# Patient Record
Sex: MALE | Race: WHITE | NOT HISPANIC OR LATINO | URBAN - METROPOLITAN AREA
[De-identification: names, ages, dates, MRNs, and addresses within clinical notes are randomized per-mention and may not be internally consistent; named-entity substitution may affect disease eponyms.]

---

## 2022-09-13 ENCOUNTER — APPOINTMENT (RX ONLY)
Dept: URBAN - METROPOLITAN AREA CLINIC 144 | Facility: CLINIC | Age: 72
Setting detail: DERMATOLOGY
End: 2022-09-13

## 2022-09-13 DIAGNOSIS — G25.81 RESTLESS LEGS SYNDROME: ICD-10-CM | Status: INADEQUATELY CONTROLLED

## 2022-09-13 DIAGNOSIS — I83.81 VARICOSE VEINS OF LOWER EXTREMITIES WITH PAIN: ICD-10-CM | Status: INADEQUATELY CONTROLLED

## 2022-09-13 DIAGNOSIS — I87.2 VENOUS INSUFFICIENCY (CHRONIC) (PERIPHERAL): ICD-10-CM

## 2022-09-13 DIAGNOSIS — I87.2 VENOUS INSUFFICIENCY (CHRONIC) (PERIPHERAL): ICD-10-CM | Status: INADEQUATELY CONTROLLED

## 2022-09-13 DIAGNOSIS — R60.0 LOCALIZED EDEMA: ICD-10-CM | Status: INADEQUATELY CONTROLLED

## 2022-09-13 DIAGNOSIS — I78.8 OTHER DISEASES OF CAPILLARIES: ICD-10-CM

## 2022-09-13 PROBLEM — I83.819 VARICOSE VEINS OF UNSPECIFIED LOWER EXTREMITY WITH PAIN: Status: ACTIVE | Noted: 2022-09-13

## 2022-09-13 PROCEDURE — 36465 NJX NONCMPND SCLRSNT 1 VEIN: CPT | Mod: RT

## 2022-09-13 PROCEDURE — ? PHLEBECTOMY

## 2022-09-13 PROCEDURE — 37766 PHLEB VEINS - EXTREM 20+: CPT

## 2022-09-13 PROCEDURE — ? VARITHENA SCLEROTHERAPY

## 2022-09-13 PROCEDURE — ? COUNSELING

## 2022-09-13 PROCEDURE — 99213 OFFICE O/P EST LOW 20 MIN: CPT | Mod: 25

## 2022-09-13 ASSESSMENT — LOCATION DETAILED DESCRIPTION DERM
LOCATION DETAILED: RIGHT PROXIMAL PRETIBIAL REGION
LOCATION DETAILED: RIGHT MEDIAL PROXIMAL PRETIBIAL REGION
LOCATION DETAILED: RIGHT PROXIMAL PRETIBIAL REGION
LOCATION DETAILED: RIGHT MEDIAL PROXIMAL PRETIBIAL REGION
LOCATION DETAILED: RIGHT MEDIAL PROXIMAL PRETIBIAL REGION
LOCATION DETAILED: RIGHT PROXIMAL PRETIBIAL REGION

## 2022-09-13 ASSESSMENT — LOCATION SIMPLE DESCRIPTION DERM
LOCATION SIMPLE: RIGHT PRETIBIAL REGION

## 2022-09-13 ASSESSMENT — LOCATION ZONE DERM
LOCATION ZONE: LEG

## 2022-09-13 NOTE — PROCEDURE: VARITHENA SCLEROTHERAPY
Show Inventory Tab: Hide
Disposition: Compression stockings and short stretch elastic bandages were placed postoperatively.
Render Post Care In Note: No
Lot # [id number]: [id number]
Detail Level: Simple
Consent was obtained with risks, benefits, and alternatives discussed for the above procedures. Photographs were taken.
Expiration Date A (Month Year): 07/2023
Number Of Injections (Will Not Render If 0): 0
Post-Care Instructions: Compression for 3 weeks is critical to optimize your recovery and results. Compliance with compression helps to prevent blood clots and minimizes pain, swelling, bruising, skin discoloration (staining) and the recurrence of vessels. Materials to gather for your wound care (all available over the counter): Compression stockings x 2 pairs, 4 x 4 gauze, Comprilan wrap: 8 cm and 10 cm width wrap, Medical adhesive tape. Compression and Wound care;  Leg compression for 3 weeks is alejandra to your success and safety. Compression at night is only needed the first day. After that, compression is needed only during waking hours. However, if your leg feels better with compression at night, then you may continue compression at night as tolerated. After the sclerotherapy procedure, 2 layers compression will be placed. 1. On the skin, folded or flat gauze will cover the treated areas. 2. A compression wrap (Comprilan) will be wrapped around your leg over the gauze. Once the compression wrap is in place, a compression stocking will be worn. This two layer  compression (wrap plus stocking) should be worn for the first 24 hours if tolerated. 3. After 24 hours, remove all compressions and dressings and just wear the compression stockings only during waking hours. You will need to wear compression stockings for three weeks after your procedure, unless your physician instructs you otherwise. Activity: It is important NOT to be sitting or lying down for several hours after surgery. You may begin walking immediately after surgery. This is good for you, but take it easy. For 2 days after treatment: Avoid aerobic exercise, weight training, and all other types of exertion that increase your breathing and pulse rates. Do not get a tan for one month after sclerotherapy. Tanning increases your risk of skin discoloration. Bathing:       After 24 hours, you may shower or bathe in tepid water, but keep the compression stocking on. Avoid immersion in hot tubs. For pain or discomfort: You may take acetaminophen (TylenolTM, Extra-Strength TylenolTM or DatrilTM) as directed. Do not use aspirin, products containing aspirin, or ibuprofen (for example AdvilTM or MotrinTM) for five days after your surgery, unless approved by your physician. Dietary restrictions: Do not drink alcoholic beverages for two days after your sclerotherapy procedure. Possible side effects following sclerotherapy:  After sclerotherapy, mild swelling is expected. The injection sites may also become bruised or gray. You may also experience one or more of the following side effects, which almost always go away within one to four months:       Darkening of the injected veins. Brownish staining of the skin. Small clotted vessels under the surface of the skin that you can feel. Bruising of the injection sites:       What to do about bruising - This will resolve within 2-3 weeks. If you wish the bruising to disappear sooner, then applying Arnicare cream (over the counter, health food stores) will help. What to do if you feel small clotted vessels under the surface of the skin:       Call us for a follow up appointment. These small clots can be drained through a small nick. Draining these small clots will help you heal faster and with less discoloration. Contact your physician if you experience any of the following:       Treated areas become increasingly sore, tender, red or warm. Acetaminophen does not relieve your discomfort. Injection sites turn black or the skin around the site breaks down. Ulceration of the injection sites. You develop blisters from the tape. You develop significant swelling or pain in the leg. Darkening of large areas of the skin or foot.
Sclerosant Volume (Cc): 3
Sclerosant (A): Varithena Foam

## 2022-09-13 NOTE — PROCEDURE: VARITHENA SCLEROTHERAPY
Show Inventory Tab: Hide
Disposition: Compression stockings and short stretch elastic bandages were placed postoperatively.
Render Post Care In Note: No
Lot # [id number]: [id number]
Detail Level: Simple
Consent was obtained with risks, benefits, and alternatives discussed for the above procedures. Photographs were taken.
Expiration Date A (Month Year): 07/2023
Number Of Injections (Will Not Render If 0): 0
Post-Care Instructions: Compression for 3 weeks is critical to optimize your recovery and results. Compliance with compression helps to prevent blood clots and minimizes pain, swelling, bruising, skin discoloration (staining) and the recurrence of vessels. Materials to gather for your wound care (all available over the counter): Compression stockings x 2 pairs, 4 x 4 gauze, Comprilan wrap: 8 cm and 10 cm width wrap, Medical adhesive tape. Compression and Wound care;  Leg compression for 3 weeks is alejandra to your success and safety. Compression at night is only needed the first day. After that, compression is needed only during waking hours. However, if your leg feels better with compression at night, then you may continue compression at night as tolerated. After the sclerotherapy procedure, 2 layers compression will be placed. 1. On the skin, folded or flat gauze will cover the treated areas. 2. A compression wrap (Comprilan) will be wrapped around your leg over the gauze. Once the compression wrap is in place, a compression stocking will be worn. This two layer  compression (wrap plus stocking) should be worn for the first 24 hours if tolerated. 3. After 24 hours, remove all compressions and dressings and just wear the compression stockings only during waking hours. You will need to wear compression stockings for three weeks after your procedure, unless your physician instructs you otherwise. Activity: It is important NOT to be sitting or lying down for several hours after surgery. You may begin walking immediately after surgery. This is good for you, but take it easy. For 2 days after treatment: Avoid aerobic exercise, weight training, and all other types of exertion that increase your breathing and pulse rates. Do not get a tan for one month after sclerotherapy. Tanning increases your risk of skin discoloration. Bathing:       After 24 hours, you may shower or bathe in tepid water, but keep the compression stocking on. Avoid immersion in hot tubs. For pain or discomfort: You may take acetaminophen (TylenolTM, Extra-Strength TylenolTM or DatrilTM) as directed. Do not use aspirin, products containing aspirin, or ibuprofen (for example AdvilTM or MotrinTM) for five days after your surgery, unless approved by your physician. Dietary restrictions: Do not drink alcoholic beverages for two days after your sclerotherapy procedure. Possible side effects following sclerotherapy:  After sclerotherapy, mild swelling is expected. The injection sites may also become bruised or gray. You may also experience one or more of the following side effects, which almost always go away within one to four months:       Darkening of the injected veins. Brownish staining of the skin. Small clotted vessels under the surface of the skin that you can feel. Bruising of the injection sites:       What to do about bruising - This will resolve within 2-3 weeks. If you wish the bruising to disappear sooner, then applying Arnicare cream (over the counter, health food stores) will help. What to do if you feel small clotted vessels under the surface of the skin:       Call us for a follow up appointment. These small clots can be drained through a small nick. Draining these small clots will help you heal faster and with less discoloration. Contact your physician if you experience any of the following:       Treated areas become increasingly sore, tender, red or warm. Acetaminophen does not relieve your discomfort. Injection sites turn black or the skin around the site breaks down. Ulceration of the injection sites. You develop blisters from the tape. You develop significant swelling or pain in the leg. Darkening of large areas of the skin or foot.
Sclerosant Volume (Cc): 3
Sclerosant (A): Varithena Foam

## 2022-09-13 NOTE — PROCEDURE: VARITHENA SCLEROTHERAPY
Show Inventory Tab: Hide
Disposition: Compression stockings and short stretch elastic bandages were placed postoperatively.
Render Post Care In Note: No
Lot # A: [id number]
Detail Level: Simple
Consent was obtained with risks, benefits, and alternatives discussed for the above procedures. Photographs were taken.
Expiration Date A (Month Year): 07/2023
Number Of Injections (Will Not Render If 0): 0
Post-Care Instructions: Compression for 3 weeks is critical to optimize your recovery and results. Compliance with compression helps to prevent blood clots and minimizes pain, swelling, bruising, skin discoloration (staining) and the recurrence of vessels. Materials to gather for your wound care (all available over the counter): Compression stockings x 2 pairs, 4 x 4 gauze, Comprilan wrap: 8 cm and 10 cm width wrap, Medical adhesive tape. Compression and Wound care;  Leg compression for 3 weeks is alejandra to your success and safety. Compression at night is only needed the first day. After that, compression is needed only during waking hours. However, if your leg feels better with compression at night, then you may continue compression at night as tolerated. After the sclerotherapy procedure, 2 layers compression will be placed. 1. On the skin, folded or flat gauze will cover the treated areas. 2. A compression wrap (Comprilan) will be wrapped around your leg over the gauze. Once the compression wrap is in place, a compression stocking will be worn. This two layer  compression (wrap plus stocking) should be worn for the first 24 hours if tolerated. 3. After 24 hours, remove all compressions and dressings and just wear the compression stockings only during waking hours. You will need to wear compression stockings for three weeks after your procedure, unless your physician instructs you otherwise. Activity: It is important NOT to be sitting or lying down for several hours after surgery. You may begin walking immediately after surgery. This is good for you, but take it easy. For 2 days after treatment: Avoid aerobic exercise, weight training, and all other types of exertion that increase your breathing and pulse rates. Do not get a tan for one month after sclerotherapy. Tanning increases your risk of skin discoloration. Bathing:       After 24 hours, you may shower or bathe in tepid water, but keep the compression stocking on. Avoid immersion in hot tubs. For pain or discomfort: You may take acetaminophen (TylenolTM, Extra-Strength TylenolTM or DatrilTM) as directed. Do not use aspirin, products containing aspirin, or ibuprofen (for example AdvilTM or MotrinTM) for five days after your surgery, unless approved by your physician. Dietary restrictions: Do not drink alcoholic beverages for two days after your sclerotherapy procedure. Possible side effects following sclerotherapy:  After sclerotherapy, mild swelling is expected. The injection sites may also become bruised or gray. You may also experience one or more of the following side effects, which almost always go away within one to four months:       Darkening of the injected veins. Brownish staining of the skin. Small clotted vessels under the surface of the skin that you can feel. Bruising of the injection sites:       What to do about bruising - This will resolve within 2-3 weeks. If you wish the bruising to disappear sooner, then applying Arnicare cream (over the counter, health food stores) will help. What to do if you feel small clotted vessels under the surface of the skin:       Call us for a follow up appointment. These small clots can be drained through a small nick. Draining these small clots will help you heal faster and with less discoloration. Contact your physician if you experience any of the following:       Treated areas become increasingly sore, tender, red or warm. Acetaminophen does not relieve your discomfort. Injection sites turn black or the skin around the site breaks down. Ulceration of the injection sites. You develop blisters from the tape. You develop significant swelling or pain in the leg. Darkening of large areas of the skin or foot.
Sclerosant Volume (Cc): 3
Sclerosant (A): Varithena Foam

## 2022-09-13 NOTE — PROCEDURE: MIPS QUALITY
Quality 47: Advance Care Plan: Advance Care Planning discussed and documented in the medical record; patient did not wish or was not able to name a surrogate decision maker or provide an advance care plan.
Detail Level: Detailed
Quality 111:Pneumonia Vaccination Status For Older Adults: Pneumococcal vaccine (PPSV23) was not administered on or after patientâs 60th birthday and before the end of the measurement period, reason not otherwise specified
Quality 130: Documentation Of Current Medications In The Medical Record: Current Medications Documented

## 2022-09-13 NOTE — PROCEDURE: PHLEBECTOMY
Detail Level: Simple
Consent was obtained with risks, benefits, and alternatives discussed for the above procedures. Photographs were taken. Preoperative medications were taken as above.
Disposition: Compression dressings were placed and stockings. Wound care instructions were given, verbal and written.
Tumescent Anesthesia Volume In Cc: 10
Post-Care Instructions: Compression is critical to optimize your recovery and results. Compression helps to prevent blood clots and minimizes pain, swelling, bruising and bleeding. \nMaterials to gather for your wound care (all available over the counter)-       \n1. Compression stockings x 2 pairs      \n2. Coban or Comprilan wraps (ACE Wraps are not a good substitute) x 2      \n3. Hibiclens solution      \n4. Telfa pads (nonstick gauze)      \n5. 4 x 4 gauze      \n6. Aquaphor or Vaseline ointment       \n7. Medical adhesive tape      \n___1. If you have had sedation, you must have a  with you for the first 24 hours after surgery and must not operate any motor vehicles or equipment that requires alertness and coordination. \n___2. Activity       \n - It is important NOT to be sitting or lying down for several hours after surgery. You may begin walking immediately after surgery. \n - As part of your surgery, an anesthetic fluid was used to numb your skin. Most of this fluid is safely absorbed by your body but some of the fluid may drain from the small skin incisions. \n - For 2 days after treatment: Avoid aerobic exercise, weight training, and all other types of exertion that increase your breathing and pulse rates. \n___3. Compression and Wound care; Leg compression is vital to your success and safety. It is needed for 2 weeks minimum. \nAfter your procedure, we will place gauze over your treated site(s) followed by a wrap (Comprilan or Coban), which is left in place for 24 hours. Your elastic stocking is then worn over the wrap. At bedtime, you may remove the stocking to sleep but keep the inner wrap (Comprilan or Coban) on.         \na. After 24 hours, remove the wrap and gauze. Make sure you are lying down. \nb. If you experience bleeding from the surgery site, place gauze over the area and apply firm pressure for 15 minutes without lifting. You may repeat this once. If bleeding persists, contact your physician. \nc. Change your dressing once daily. Lie down and remove the stockings and inner Comprilan wrap but not the gauze. Take a shower with the gauze on and get it wet. \nd. Remove the wet gauze in the shower and clean the incisions with diluted Hibiclens solution. Finish showering and pat dry. \ne. Place some ointment (Aquaphor, Vaseline) over any incision sites and apply Telfa or non-stick gauze pad. Secure the Telfa or non-stick gauze pad with medical tape. \nf. While lying down, rewrap your treated leg with Coban or Comprilan  followed by your stocking. The inner wrap and outer stocking combination should be worn for the first 7 days. \ng. After the first week, you may discontinue the inner wrap and only wear stockings for another 7 days. The longer you wear the stockings beyond the minimum 2 weeks, the faster you will heal.      \n___4. Bathing       \n - Do not bathe for 24 hours. After 24 hours, you may shower in warm (not hot) water. \n - Clean your surgery sites during showering (3e) and when you are finished bathing, pat your leg dry with a towel and repeat wound care instructions in 3f and 3g. \n - For one week after treatment:  Avoid immersion in hot tubs      \n___5. Discomfort; Any pain or discomfort should decrease with each day after surgery. \n - You may take acetaminophen (Tylenol, Extra-Strength Tylenol or Datril) or prescribed medicines as directed. If your pain is not better, then please contact your physician      \n - Do not use aspirin, products containing aspirin, or ibuprofen (for example [address] or MotrinTM) for five days after your surgery, unless approved by your physician. \n___6. Dietary restrictions; Do not drink alcoholic beverages for two days after your phlebectomy. \nWhat to expect after microphlebectomy       \n - Bruising and swelling at the site of the surgery are expected and is usually gone by 2 weeks. \n - When to contact your physician Contact your physician if you experience any of the following:       \n - Treated areas become increasingly sore, tender, red or warm.        \n - Incision sites have pus like drainage      \n - Acetaminophen does not relieve your discomfort       \n - Fever higher than 100.4 degrees Fahrenheit (38 degrees Celsius)
Hemostasis: Pressure
Estimated Blood Loss (Cc): minimal
Medical Necessity Information: LCD Guidelines vary from payer to payer. Please check with your payer's policy to determine medical necessity.
Medical Necessity Clause: This therapy was medically necessary because the patient has
Epidermal Sutures (Optional): 5-0 Ethilon
Number Of Incisions Per Microphlebectomy: [address] [facility]

## 2022-09-13 NOTE — HPI: PROCEDURE (INJECTION)
Have You Had This Injection Before?: has not been previously injected
Body Location Override (Optional): Right distal gsv

## 2022-09-13 NOTE — PROCEDURE: PHLEBECTOMY
Detail Level: Simple
Consent was obtained with risks, benefits, and alternatives discussed for the above procedures. Photographs were taken. Preoperative medications were taken as above.
Disposition: Compression dressings were placed and stockings. Wound care instructions were given, verbal and written.
Tumescent Anesthesia Volume In Cc: 10
Post-Care Instructions: Compression is critical to optimize your recovery and results. Compression helps to prevent blood clots and minimizes pain, swelling, bruising and bleeding. \nMaterials to gather for your wound care (all available over the counter)-       \n1. Compression stockings x 2 pairs      \n2. Coban or Comprilan wraps (ACE Wraps are not a good substitute) x 2      \n3. Hibiclens solution      \n4. Telfa pads (nonstick gauze)      \n5. 4 x 4 gauze      \n6. Aquaphor or Vaseline ointment       \n7. Medical adhesive tape      \n___1. If you have had sedation, you must have a  with you for the first 24 hours after surgery and must not operate any motor vehicles or equipment that requires alertness and coordination. \n___2. Activity       \n - It is important NOT to be sitting or lying down for several hours after surgery. You may begin walking immediately after surgery. \n - As part of your surgery, an anesthetic fluid was used to numb your skin. Most of this fluid is safely absorbed by your body but some of the fluid may drain from the small skin incisions. \n - For 2 days after treatment: Avoid aerobic exercise, weight training, and all other types of exertion that increase your breathing and pulse rates. \n___3. Compression and Wound care; Leg compression is vital to your success and safety. It is needed for 2 weeks minimum. \nAfter your procedure, we will place gauze over your treated site(s) followed by a wrap (Comprilan or Coban), which is left in place for 24 hours. Your elastic stocking is then worn over the wrap. At bedtime, you may remove the stocking to sleep but keep the inner wrap (Comprilan or Coban) on.         \na. After 24 hours, remove the wrap and gauze. Make sure you are lying down. \nb. If you experience bleeding from the surgery site, place gauze over the area and apply firm pressure for 15 minutes without lifting. You may repeat this once. If bleeding persists, contact your physician. \nc. Change your dressing once daily. Lie down and remove the stockings and inner Comprilan wrap but not the gauze. Take a shower with the gauze on and get it wet. \nd. Remove the wet gauze in the shower and clean the incisions with diluted Hibiclens solution. Finish showering and pat dry. \ne. Place some ointment (Aquaphor, Vaseline) over any incision sites and apply Telfa or non-stick gauze pad. Secure the Telfa or non-stick gauze pad with medical tape. \nf. While lying down, rewrap your treated leg with Coban or Comprilan  followed by your stocking. The inner wrap and outer stocking combination should be worn for the first 7 days. \ng. After the first week, you may discontinue the inner wrap and only wear stockings for another 7 days. The longer you wear the stockings beyond the minimum 2 weeks, the faster you will heal.      \n___4. Bathing       \n - Do not bathe for 24 hours. After 24 hours, you may shower in warm (not hot) water. \n - Clean your surgery sites during showering (3e) and when you are finished bathing, pat your leg dry with a towel and repeat wound care instructions in 3f and 3g. \n - For one week after treatment:  Avoid immersion in hot tubs      \n___5. Discomfort; Any pain or discomfort should decrease with each day after surgery. \n - You may take acetaminophen (Tylenol, Extra-Strength Tylenol or Datril) or prescribed medicines as directed. If your pain is not better, then please contact your physician      \n - Do not use aspirin, products containing aspirin, or ibuprofen (for example [address] or MotrinTM) for five days after your surgery, unless approved by your physician. \n___6. Dietary restrictions; Do not drink alcoholic beverages for two days after your phlebectomy. \nWhat to expect after microphlebectomy       \n - Bruising and swelling at the site of the surgery are expected and is usually gone by 2 weeks. \n - When to contact your physician Contact your physician if you experience any of the following:       \n - Treated areas become increasingly sore, tender, red or warm.        \n - Incision sites have pus like drainage      \n - Acetaminophen does not relieve your discomfort       \n - Fever higher than 100.4 degrees Fahrenheit (38 degrees Celsius)
Hemostasis: Pressure
Estimated Blood Loss (Cc): minimal
Medical Necessity Information: LCD Guidelines vary from payer to payer. Please check with your payer's policy to determine medical necessity.
Medical Necessity Clause: This therapy was medically necessary because the patient has
Epidermal Sutures (Optional): 5-0 Ethilon
Number Of Incisions Per Microphlebectomy: [address] [facility], [place]

## 2022-09-13 NOTE — PROCEDURE: PHLEBECTOMY
Detail Level: Simple
Consent was obtained with risks, benefits, and alternatives discussed for the above procedures. Photographs were taken. Preoperative medications were taken as above.
Disposition: Compression dressings were placed and stockings. Wound care instructions were given, verbal and written.
Tumescent Anesthesia Volume In Cc: 10
Post-Care Instructions: Compression is critical to optimize your recovery and results. Compression helps to prevent blood clots and minimizes pain, swelling, bruising and bleeding. \nMaterials to gather for your wound care (all available over the counter)-       \n1. Compression stockings x 2 pairs      \n2. Coban or Comprilan wraps (ACE Wraps are not a good substitute) x 2      \n3. Hibiclens solution      \n4. Telfa pads (nonstick gauze)      \n5. 4 x 4 gauze      \n6. Aquaphor or Vaseline ointment       \n7. Medical adhesive tape      \n___1. If you have had sedation, you must have a  with you for the first 24 hours after surgery and must not operate any motor vehicles or equipment that requires alertness and coordination. \n___2. Activity       \n - It is important NOT to be sitting or lying down for several hours after surgery. You may begin walking immediately after surgery. \n - As part of your surgery, an anesthetic fluid was used to numb your skin. Most of this fluid is safely absorbed by your body but some of the fluid may drain from the small skin incisions. \n - For 2 days after treatment: Avoid aerobic exercise, weight training, and all other types of exertion that increase your breathing and pulse rates. \n___3. Compression and Wound care; Leg compression is vital to your success and safety. It is needed for 2 weeks minimum. \nAfter your procedure, we will place gauze over your treated site(s) followed by a wrap (Comprilan or Coban), which is left in place for 24 hours. Your elastic stocking is then worn over the wrap. At bedtime, you may remove the stocking to sleep but keep the inner wrap (Comprilan or Coban) on.         \na. After 24 hours, remove the wrap and gauze. Make sure you are lying down. \nb. If you experience bleeding from the surgery site, place gauze over the area and apply firm pressure for 15 minutes without lifting. You may repeat this once. If bleeding persists, contact your physician. \nc. Change your dressing once daily. Lie down and remove the stockings and inner Comprilan wrap but not the gauze. Take a shower with the gauze on and get it wet. \nd. Remove the wet gauze in the shower and clean the incisions with diluted Hibiclens solution. Finish showering and pat dry. \ne. Place some ointment (Aquaphor, Vaseline) over any incision sites and apply Telfa or non-stick gauze pad. Secure the Telfa or non-stick gauze pad with medical tape. \nf. While lying down, rewrap your treated leg with Coban or Comprilan  followed by your stocking. The inner wrap and outer stocking combination should be worn for the first 7 days. \ng. After the first week, you may discontinue the inner wrap and only wear stockings for another 7 days. The longer you wear the stockings beyond the minimum 2 weeks, the faster you will heal.      \n___4. Bathing       \n - Do not bathe for 24 hours. After 24 hours, you may shower in warm (not hot) water. \n - Clean your surgery sites during showering (3e) and when you are finished bathing, pat your leg dry with a towel and repeat wound care instructions in 3f and 3g. \n - For one week after treatment:  Avoid immersion in hot tubs      \n___5. Discomfort; Any pain or discomfort should decrease with each day after surgery. \n - You may take acetaminophen (Tylenol, Extra-Strength Tylenol or Datril) or prescribed medicines as directed. If your pain is not better, then please contact your physician      \n - Do not use aspirin, products containing aspirin, or ibuprofen (for example [address] or MotrinTM) for five days after your surgery, unless approved by your physician. \n___6. Dietary restrictions; Do not drink alcoholic beverages for two days after your phlebectomy. \nWhat to expect after microphlebectomy       \n - Bruising and swelling at the site of the surgery are expected and is usually gone by 2 weeks. \n - When to contact your physician Contact your physician if you experience any of the following:       \n - Treated areas become increasingly sore, tender, red or warm.        \n - Incision sites have pus like drainage      \n - Acetaminophen does not relieve your discomfort       \n - Fever higher than 100.4 degrees Fahrenheit (38 degrees Celsius)
Hemostasis: Pressure
Estimated Blood Loss (Cc): minimal
Medical Necessity Information: LCD Guidelines vary from payer to payer. Please check with your payer's policy to determine medical necessity.
Medical Necessity Clause: This therapy was medically necessary because the patient has
Epidermal Sutures (Optional): 5-0 Ethilon
Number Of Incisions Per Microphlebectomy: [address] [facility]

## 2022-09-20 ENCOUNTER — APPOINTMENT (RX ONLY)
Dept: URBAN - METROPOLITAN AREA CLINIC 144 | Facility: CLINIC | Age: 72
Setting detail: DERMATOLOGY
End: 2022-09-20

## 2022-09-20 DIAGNOSIS — Z48.02 ENCOUNTER FOR REMOVAL OF SUTURES: ICD-10-CM

## 2022-09-20 PROCEDURE — ? SUTURE REMOVAL (GLOBAL PERIOD)

## 2022-09-20 ASSESSMENT — LOCATION DETAILED DESCRIPTION DERM: LOCATION DETAILED: RIGHT MEDIAL PROXIMAL PRETIBIAL REGION

## 2022-09-20 ASSESSMENT — LOCATION ZONE DERM: LOCATION ZONE: LEG

## 2022-09-20 ASSESSMENT — LOCATION SIMPLE DESCRIPTION DERM: LOCATION SIMPLE: RIGHT PRETIBIAL REGION

## 2022-09-20 NOTE — PROCEDURE: SUTURE REMOVAL (GLOBAL PERIOD)
Body Location Override (Optional - Billing Will Still Be Based On Selected Body Map Location If Applicable): right medial lower leg
Detail Level: Detailed
Add 97471 Cpt? (Important Note: In 2017 The Use Of 47578 Is Being Tracked By Cms To Determine Future Global Period Reimbursement For Global Periods): no

## 2022-12-13 ENCOUNTER — APPOINTMENT (RX ONLY)
Dept: URBAN - METROPOLITAN AREA CLINIC 144 | Facility: CLINIC | Age: 72
Setting detail: DERMATOLOGY
End: 2022-12-13

## 2022-12-13 DIAGNOSIS — I87.2 VENOUS INSUFFICIENCY (CHRONIC) (PERIPHERAL): ICD-10-CM

## 2022-12-13 PROCEDURE — ? MEDICAL CONSULTATION: VENOUS DISEASE

## 2022-12-13 PROCEDURE — ? LOWER EXTREMITY DOPPLER US

## 2022-12-13 PROCEDURE — 99213 OFFICE O/P EST LOW 20 MIN: CPT

## 2022-12-13 PROCEDURE — 93971 EXTREMITY STUDY: CPT

## 2022-12-13 ASSESSMENT — LOCATION ZONE DERM: LOCATION ZONE: LEG

## 2022-12-13 ASSESSMENT — LOCATION DETAILED DESCRIPTION DERM: LOCATION DETAILED: RIGHT DISTAL PRETIBIAL REGION

## 2022-12-13 ASSESSMENT — LOCATION SIMPLE DESCRIPTION DERM: LOCATION SIMPLE: RIGHT PRETIBIAL REGION

## 2022-12-13 NOTE — PROCEDURE: LOWER EXTREMITY DOPPLER US
Recommend Sclerotherapy With Ultrasound Guidance On Right Side: No
Continue Conservative Therapy: Yes
Right Tributary Reflux: 4.0-4.5 seconds
Left Intraluminal Thrombus- Yes: The left deep veins were imaged from the level of the common femoral vein to the posterior tibial veins. There was evidence of intraluminal thrombus as noted above.
Right Intraluminal Thrombus- Yes: The right deep veins were imaged from the level of the common femoral vein to the posterior tibial veins. There was evidence of intraluminal thrombus as noted above.
Right Tributary Size: 4-5 mm
Size Options: Use Range
Size: 6-7 mm
Right Assessment Comments: Recommended treatment: Varithena w/ Mp.
Detail Level: Zone
Left Intraluminal Thrombus- No: The left deep veins were imaged from the level of the common femoral vein to the posterior tibial veins. All deep veins demonstrated compressibility without evidence of intraluminal thrombus.
Right Intraluminal Thrombus- No: The right deep veins were imaged from the level of the common femoral vein to the posterior tibial veins. All deep veins demonstrated compressibility without evidence of intraluminal thrombus.
See Attached Documentation Text: Please refer to the attached ultrasound documentation for complete details of the procedure and the venous findings.
Continue Conservative Therapy Text: Continue conservative treatment (such as compression stockings, OTC analgesics, and exercise) and consider intervention if no change or worsening symptoms to varicosities.

## 2022-12-13 NOTE — PROCEDURE: MEDICAL CONSULTATION: VENOUS DISEASE
Left Leg Compression Therapy: 0- None or noncompliant
Right Leg Varicose Veins: 2- Multiple: GS varicose veins confined to calf or thigh
Left Leg Pain: 0- None
Left Leg Circumference: medium
Include Right Vcss In Note: Yes
Right Leg Venous Edema: 2- Afternoon edema above ankle
Right Dorsalis Pedis Pulse: 2 (Easily palpable)
Include Left Vcss In Note: No
Detail Level: Zone
Right Leg Venous Hyperpigmentation: 0- None or focal low intensity (tan)
Follow Up Instructions:: Patient will follow up after the bilateral duplex ultrasound venous reflux study to review the results and finalize treatment plan. The patient must wear compression stockings. Preventive strategies include weight loss through diet and exercise and toning leg muscles. A venous fact sheet was given, which reviews venous anatomy/pathophysiology and treatment options. The pathophysiology of venous disease and potential treatment options were discussed in detail, especially the non-FDA status of foam sclerotherapy with its risks benefits and alternatives. The patient's questions were answered in full.
Right Leg Pain: 2- Daily, moderate activity limitation, occasional analgesics
Right Leg Compression Therapy: 3- Full compliance: stockings and elevation

## 2023-01-12 ENCOUNTER — APPOINTMENT (RX ONLY)
Dept: URBAN - METROPOLITAN AREA CLINIC 142 | Facility: CLINIC | Age: 73
Setting detail: DERMATOLOGY
End: 2023-01-12

## 2023-01-12 DIAGNOSIS — L81.4 OTHER MELANIN HYPERPIGMENTATION: ICD-10-CM

## 2023-01-12 DIAGNOSIS — L82.1 OTHER SEBORRHEIC KERATOSIS: ICD-10-CM

## 2023-01-12 DIAGNOSIS — D18.0 HEMANGIOMA: ICD-10-CM

## 2023-01-12 DIAGNOSIS — D22 MELANOCYTIC NEVI: ICD-10-CM

## 2023-01-12 PROBLEM — D22.61 MELANOCYTIC NEVI OF RIGHT UPPER LIMB, INCLUDING SHOULDER: Status: ACTIVE | Noted: 2023-01-12

## 2023-01-12 PROBLEM — D22.62 MELANOCYTIC NEVI OF LEFT UPPER LIMB, INCLUDING SHOULDER: Status: ACTIVE | Noted: 2023-01-12

## 2023-01-12 PROBLEM — D18.01 HEMANGIOMA OF SKIN AND SUBCUTANEOUS TISSUE: Status: ACTIVE | Noted: 2023-01-12

## 2023-01-12 PROBLEM — D22.71 MELANOCYTIC NEVI OF RIGHT LOWER LIMB, INCLUDING HIP: Status: ACTIVE | Noted: 2023-01-12

## 2023-01-12 PROBLEM — D22.72 MELANOCYTIC NEVI OF LEFT LOWER LIMB, INCLUDING HIP: Status: ACTIVE | Noted: 2023-01-12

## 2023-01-12 PROBLEM — D22.5 MELANOCYTIC NEVI OF TRUNK: Status: ACTIVE | Noted: 2023-01-12

## 2023-01-12 PROCEDURE — 99213 OFFICE O/P EST LOW 20 MIN: CPT

## 2023-01-12 PROCEDURE — ? COUNSELING

## 2023-01-12 ASSESSMENT — LOCATION ZONE DERM
LOCATION ZONE: LEG
LOCATION ZONE: ARM
LOCATION ZONE: TRUNK

## 2023-01-12 ASSESSMENT — LOCATION DETAILED DESCRIPTION DERM
LOCATION DETAILED: RIGHT ANTERIOR DISTAL THIGH
LOCATION DETAILED: LEFT MID-UPPER BACK
LOCATION DETAILED: LEFT PROXIMAL POSTERIOR UPPER ARM
LOCATION DETAILED: RIGHT ANTERIOR PROXIMAL UPPER ARM
LOCATION DETAILED: LEFT ANTERIOR DISTAL THIGH
LOCATION DETAILED: RIGHT PROXIMAL POSTERIOR UPPER ARM

## 2023-01-12 ASSESSMENT — LOCATION SIMPLE DESCRIPTION DERM
LOCATION SIMPLE: LEFT THIGH
LOCATION SIMPLE: RIGHT UPPER ARM
LOCATION SIMPLE: RIGHT THIGH
LOCATION SIMPLE: LEFT UPPER ARM
LOCATION SIMPLE: LEFT UPPER BACK

## 2023-02-07 ENCOUNTER — APPOINTMENT (RX ONLY)
Dept: URBAN - METROPOLITAN AREA CLINIC 144 | Facility: CLINIC | Age: 73
Setting detail: DERMATOLOGY
End: 2023-02-07

## 2023-02-07 DIAGNOSIS — I87.2 VENOUS INSUFFICIENCY (CHRONIC) (PERIPHERAL): ICD-10-CM | Status: WORSENING

## 2023-02-07 PROCEDURE — 37765 STAB PHLEB VEINS XTR 10-20: CPT

## 2023-02-07 PROCEDURE — ? VARITHENA SCLEROTHERAPY

## 2023-02-07 PROCEDURE — ? PHLEBECTOMY

## 2023-02-07 PROCEDURE — 36465 NJX NONCMPND SCLRSNT 1 VEIN: CPT | Mod: RT

## 2023-02-07 ASSESSMENT — LOCATION SIMPLE DESCRIPTION DERM: LOCATION SIMPLE: RIGHT PRETIBIAL REGION

## 2023-02-07 ASSESSMENT — LOCATION ZONE DERM: LOCATION ZONE: LEG

## 2023-02-07 ASSESSMENT — LOCATION DETAILED DESCRIPTION DERM: LOCATION DETAILED: RIGHT PROXIMAL PRETIBIAL REGION

## 2023-02-07 NOTE — PROCEDURE: PHLEBECTOMY
Medical Necessity Information: LCD Guidelines vary from payer to payer. Please check with your payer's policy to determine medical necessity.
Hemostasis: Pressure
Detail Level: Simple
Consent was obtained with risks, benefits, and alternatives discussed for the above procedures. Photographs were taken. Preoperative medications were taken as above.
Number Of Incisions Per Microphlebectomy: 10
Medical Necessity Clause: This therapy was medically necessary because the patient has
Epidermal Sutures (Optional): 5-0 Ethilon
Estimated Blood Loss (Cc): minimal
Post-Care Instructions: Compression is critical to optimize your recovery and results. Compression helps to prevent blood clots and minimizes pain, swelling, bruising and bleeding. \nMaterials to gather for your wound care (all available over the counter)-       \n1. Compression stockings x 2 pairs      \n2. Coban or Comprilan wraps (ACE Wraps are not a good substitute) x 2      \n3. Hibiclens solution      \n4. Telfa pads (nonstick gauze)      \n5. 4 x 4 gauze      \n6. Aquaphor or Vaseline ointment       \n7. Medical adhesive tape      \n___1. If you have had sedation, you must have a  with you for the first 24 hours after surgery and must not operate any motor vehicles or equipment that requires alertness and coordination. \n___2. Activity       \n - It is important NOT to be sitting or lying down for several hours after surgery. You may begin walking immediately after surgery. \n - As part of your surgery, an anesthetic fluid was used to numb your skin. Most of this fluid is safely absorbed by your body but some of the fluid may drain from the small skin incisions. \n - For 2 days after treatment: Avoid aerobic exercise, weight training, and all other types of exertion that increase your breathing and pulse rates. \n___3. Compression and Wound care; Leg compression is vital to your success and safety. It is needed for 2 weeks minimum. \nAfter your procedure, we will place gauze over your treated site(s) followed by a wrap (Comprilan or Coban), which is left in place for 24 hours. Your elastic stocking is then worn over the wrap. At bedtime, you may remove the stocking to sleep but keep the inner wrap (Comprilan or Coban) on.         \na. After 24 hours, remove the wrap and gauze. Make sure you are lying down. \nb. If you experience bleeding from the surgery site, place gauze over the area and apply firm pressure for 15 minutes without lifting. You may repeat this once. If bleeding persists, contact your physician. \nc. Change your dressing once daily. Lie down and remove the stockings and inner Comprilan wrap but not the gauze. Take a shower with the gauze on and get it wet. \nd. Remove the wet gauze in the shower and clean the incisions with diluted Hibiclens solution. Finish showering and pat dry. \ne. Place some ointment (Aquaphor, Vaseline) over any incision sites and apply Telfa or non-stick gauze pad. Secure the Telfa or non-stick gauze pad with medical tape. \nf. While lying down, rewrap your treated leg with Coban or Comprilan  followed by your stocking. The inner wrap and outer stocking combination should be worn for the first 7 days. \ng. After the first week, you may discontinue the inner wrap and only wear stockings for another 7 days. The longer you wear the stockings beyond the minimum 2 weeks, the faster you will heal.      \n___4. Bathing       \n - Do not bathe for 24 hours. After 24 hours, you may shower in warm (not hot) water. \n - Clean your surgery sites during showering (3e) and when you are finished bathing, pat your leg dry with a towel and repeat wound care instructions in 3f and 3g. \n - For one week after treatment:  Avoid immersion in hot tubs      \n___5. Discomfort; Any pain or discomfort should decrease with each day after surgery. \n - You may take acetaminophen (Tylenol, Extra-Strength Tylenol or Datril) or prescribed medicines as directed. If your pain is not better, then please contact your physician      \n - Do not use aspirin, products containing aspirin, or ibuprofen (for example [address] or MotrinTM) for five days after your surgery, unless approved by your physician. \n___6. Dietary restrictions; Do not drink alcoholic beverages for two days after your phlebectomy. \nWhat to expect after microphlebectomy       \n - Bruising and swelling at the site of the surgery are expected and is usually gone by 2 weeks. \n - When to contact your physician Contact your physician if you experience any of the following:       \n - Treated areas become increasingly sore, tender, red or warm.        \n - Incision sites have pus like drainage      \n - Acetaminophen does not relieve your discomfort       \n - Fever higher than 100.4 degrees Fahrenheit (38 degrees Celsius)
Disposition: Compression dressings were placed and stockings. Wound care instructions were given, verbal and written.
Tumescent Anesthesia Volume In Cc: 6

## 2023-02-07 NOTE — PROCEDURE: VARITHENA SCLEROTHERAPY
Number Of Injections (Will Not Render If 0): 0
Show Inventory Tab: Hide
Expiration Date A (Month Year): 10/2023
Detail Level: Simple
Sclerosant (A): Varithena Foam
Render Post Care In Note: No
Post-Care Instructions: Compression for 3 weeks is critical to optimize your recovery and results. Compliance with compression helps to prevent blood clots and minimizes pain, swelling, bruising, skin discoloration (staining) and the recurrence of vessels. Materials to gather for your wound care (all available over the counter): Compression stockings x 2 pairs, 4 x 4 gauze, Comprilan wrap: 8 cm and 10 cm width wrap, Medical adhesive tape. Compression and Wound care;  Leg compression for 3 weeks is alejandra to your success and safety. Compression at night is only needed the first day. After that, compression is needed only during waking hours. However, if your leg feels better with compression at night, then you may continue compression at night as tolerated. After the sclerotherapy procedure, 2 layers compression will be placed. 1. On the skin, folded or flat gauze will cover the treated areas. 2. A compression wrap (Comprilan) will be wrapped around your leg over the gauze. Once the compression wrap is in place, a compression stocking will be worn. This two layer  compression (wrap plus stocking) should be worn for the first 24 hours if tolerated. 3. After 24 hours, remove all compressions and dressings and just wear the compression stockings only during waking hours. You will need to wear compression stockings for three weeks after your procedure, unless your physician instructs you otherwise. Activity: It is important NOT to be sitting or lying down for several hours after surgery. You may begin walking immediately after surgery. This is good for you, but take it easy. For 2 days after treatment: Avoid aerobic exercise, weight training, and all other types of exertion that increase your breathing and pulse rates. Do not get a tan for one month after sclerotherapy. Tanning increases your risk of skin discoloration. Bathing:       After 24 hours, you may shower or bathe in tepid water, but keep the compression stocking on. Avoid immersion in hot tubs. For pain or discomfort: You may take acetaminophen (TylenolTM, Extra-Strength TylenolTM or DatrilTM) as directed. Do not use aspirin, products containing aspirin, or ibuprofen (for example AdvilTM or MotrinTM) for five days after your surgery, unless approved by your physician. Dietary restrictions: Do not drink alcoholic beverages for two days after your sclerotherapy procedure. Possible side effects following sclerotherapy:  After sclerotherapy, mild swelling is expected. The injection sites may also become bruised or gray. You may also experience one or more of the following side effects, which almost always go away within one to four months:       Darkening of the injected veins. Brownish staining of the skin. Small clotted vessels under the surface of the skin that you can feel. Bruising of the injection sites:       What to do about bruising - This will resolve within 2-3 weeks. If you wish the bruising to disappear sooner, then applying Arnicare cream (over the counter, health food stores) will help. What to do if you feel small clotted vessels under the surface of the skin:       Call us for a follow up appointment. These small clots can be drained through a small nick. Draining these small clots will help you heal faster and with less discoloration. Contact your physician if you experience any of the following:       Treated areas become increasingly sore, tender, red or warm. Acetaminophen does not relieve your discomfort. Injection sites turn black or the skin around the site breaks down. Ulceration of the injection sites. You develop blisters from the tape. You develop significant swelling or pain in the leg. Darkening of large areas of the skin or foot.
Sclerosant Volume (Cc): 2
Lot # A: [id number]
Consent was obtained with risks, benefits, and alternatives discussed for the above procedures. Photographs were taken.
Disposition: Compression stockings and short stretch elastic bandages were placed postoperatively.

## 2023-02-14 ENCOUNTER — APPOINTMENT (RX ONLY)
Dept: URBAN - METROPOLITAN AREA CLINIC 144 | Facility: CLINIC | Age: 73
Setting detail: DERMATOLOGY
End: 2023-02-14

## 2023-02-14 DIAGNOSIS — Z48.02 ENCOUNTER FOR REMOVAL OF SUTURES: ICD-10-CM

## 2023-02-14 PROCEDURE — ? SUTURE REMOVAL (GLOBAL PERIOD)

## 2023-02-14 ASSESSMENT — LOCATION ZONE DERM
LOCATION ZONE: LEG
LOCATION ZONE: LEG

## 2023-02-14 ASSESSMENT — LOCATION SIMPLE DESCRIPTION DERM
LOCATION SIMPLE: RIGHT PRETIBIAL REGION
LOCATION SIMPLE: RIGHT PRETIBIAL REGION

## 2023-02-14 ASSESSMENT — LOCATION DETAILED DESCRIPTION DERM
LOCATION DETAILED: RIGHT DISTAL PRETIBIAL REGION
LOCATION DETAILED: RIGHT DISTAL PRETIBIAL REGION

## 2023-02-14 NOTE — PROCEDURE: SUTURE REMOVAL (GLOBAL PERIOD)
Body Location Override (Optional - Billing Will Still Be Based On Selected Body Map Location If Applicable): right lower leg
Detail Level: Simple
Add 32641 Cpt? (Important Note: In 2017 The Use Of 21109 Is Being Tracked By Cms To Determine Future Global Period Reimbursement For Global Periods): no

## 2023-05-16 ENCOUNTER — APPOINTMENT (RX ONLY)
Dept: URBAN - METROPOLITAN AREA CLINIC 144 | Facility: CLINIC | Age: 73
Setting detail: DERMATOLOGY
End: 2023-05-16

## 2023-05-16 DIAGNOSIS — I87.2 VENOUS INSUFFICIENCY (CHRONIC) (PERIPHERAL): ICD-10-CM

## 2023-05-16 PROCEDURE — 99212 OFFICE O/P EST SF 10 MIN: CPT

## 2023-05-16 PROCEDURE — ? LOWER EXTREMITY DOPPLER US

## 2023-05-16 PROCEDURE — 93971 EXTREMITY STUDY: CPT

## 2023-05-16 PROCEDURE — ? MEDICAL CONSULTATION: VENOUS DISEASE

## 2023-05-16 NOTE — HPI: VEIN EVALUATION
How Severe Is/Are Your Symptoms?: moderate
Is This A New Presentation, Or A Follow-Up?: Follow Up Venous Evaluation

## 2023-05-16 NOTE — PROCEDURE: MEDICAL CONSULTATION: VENOUS DISEASE
Left Leg Varicose Veins: 0- None
Right Leg Circumference: medium
Left Leg: Peripheral Vascular Disease?: No
Right Leg Pain: 2- Daily, moderate activity limitation, occasional analgesics
Include Ceap In The Note?: Yes
Right Leg Varicose Veins: 2- Multiple: GS varicose veins confined to calf or thigh
Right Leg Venous Edema: 2- Afternoon edema above ankle
Left Leg Venous Hyperpigmentation: 0- None or focal low intensity (tan)
Right Dorsalis Pedis Pulse: 2 (Easily palpable)
Right Leg Compression Therapy: 3- Full compliance: stockings and elevation
Left Leg Compression Therapy: 0- None or noncompliant
Detail Level: Simple
Follow Up Instructions:: Patient will follow up after the bilateral duplex ultrasound venous reflux study to review the results and finalize treatment plan. The patient must wear compression stockings. Preventive strategies include weight loss through diet and exercise and toning leg muscles. A venous fact sheet was given, which reviews venous anatomy/pathophysiology and treatment options. The pathophysiology of venous disease and potential treatment options were discussed in detail, especially the non-FDA status of foam sclerotherapy with its risks benefits and alternatives. The patient's questions were answered in full.

## 2023-05-16 NOTE — PROCEDURE: LOWER EXTREMITY DOPPLER US
Recommend Sclerotherapy With Ultrasound Guidance On Right Side: No
Continue Conservative Therapy Text: Continue conservative treatment (such as compression stockings, OTC analgesics, and exercise) and consider intervention if no change or worsening symptoms to varicosities.
Right Tributary Reflux: 3.0-3.5 seconds
Size Options: Use Range
Size: 6-7 mm
Continue Conservative Therapy: Yes
Right Tributary Size: 4-5 mm
Left Intraluminal Thrombus- No: The left deep veins were imaged from the level of the common femoral vein to the posterior tibial veins. All deep veins demonstrated compressibility without evidence of intraluminal thrombus.
Right Intraluminal Thrombus- No: The right deep veins were imaged from the level of the common femoral vein to the posterior tibial veins. All deep veins demonstrated compressibility without evidence of intraluminal thrombus.
Right Intraluminal Thrombus- Yes: The right deep veins were imaged from the level of the common femoral vein to the posterior tibial veins. There was evidence of intraluminal thrombus as noted above.
Detail Level: Simple
Right Lower Extremity Comments: Right distal gsv Recanalization
See Attached Documentation Text: Please refer to the attached ultrasound documentation for complete details of the procedure and the venous findings.
Left Intraluminal Thrombus- Yes: The left deep veins were imaged from the level of the common femoral vein to the posterior tibial veins. There was evidence of intraluminal thrombus as noted above.

## 2023-08-22 ENCOUNTER — APPOINTMENT (RX ONLY)
Dept: URBAN - METROPOLITAN AREA CLINIC 146 | Facility: CLINIC | Age: 73
Setting detail: DERMATOLOGY
End: 2023-08-22

## 2023-08-22 DIAGNOSIS — D18.0 HEMANGIOMA: ICD-10-CM

## 2023-08-22 DIAGNOSIS — L57.0 ACTINIC KERATOSIS: ICD-10-CM

## 2023-08-22 DIAGNOSIS — Z12.83 ENCOUNTER FOR SCREENING FOR MALIGNANT NEOPLASM OF SKIN: ICD-10-CM

## 2023-08-22 DIAGNOSIS — L82.1 OTHER SEBORRHEIC KERATOSIS: ICD-10-CM

## 2023-08-22 DIAGNOSIS — L81.4 OTHER MELANIN HYPERPIGMENTATION: ICD-10-CM

## 2023-08-22 PROBLEM — D18.01 HEMANGIOMA OF SKIN AND SUBCUTANEOUS TISSUE: Status: ACTIVE | Noted: 2023-08-22

## 2023-08-22 PROCEDURE — 99213 OFFICE O/P EST LOW 20 MIN: CPT | Mod: 25

## 2023-08-22 PROCEDURE — ? COUNSELING

## 2023-08-22 PROCEDURE — 17000 DESTRUCT PREMALG LESION: CPT

## 2023-08-22 PROCEDURE — ? LIQUID NITROGEN

## 2023-08-22 PROCEDURE — 17003 DESTRUCT PREMALG LES 2-14: CPT

## 2023-08-22 ASSESSMENT — LOCATION SIMPLE DESCRIPTION DERM
LOCATION SIMPLE: RIGHT UPPER ARM
LOCATION SIMPLE: ABDOMEN
LOCATION SIMPLE: LEFT UPPER BACK
LOCATION SIMPLE: RIGHT CHEEK
LOCATION SIMPLE: LEFT UPPER ARM
LOCATION SIMPLE: CHEST
LOCATION SIMPLE: RIGHT UPPER BACK
LOCATION SIMPLE: LEFT FOREARM

## 2023-08-22 ASSESSMENT — LOCATION DETAILED DESCRIPTION DERM
LOCATION DETAILED: PERIUMBILICAL SKIN
LOCATION DETAILED: LEFT DISTAL POSTERIOR UPPER ARM
LOCATION DETAILED: EPIGASTRIC SKIN
LOCATION DETAILED: RIGHT MID-UPPER BACK
LOCATION DETAILED: LEFT SUPERIOR UPPER BACK
LOCATION DETAILED: LEFT DISTAL DORSAL FOREARM
LOCATION DETAILED: RIGHT CENTRAL MALAR CHEEK
LOCATION DETAILED: RIGHT MEDIAL SUPERIOR CHEST
LOCATION DETAILED: LEFT PROXIMAL DORSAL FOREARM
LOCATION DETAILED: RIGHT DISTAL POSTERIOR UPPER ARM

## 2023-08-22 ASSESSMENT — LOCATION ZONE DERM
LOCATION ZONE: ARM
LOCATION ZONE: TRUNK
LOCATION ZONE: FACE

## 2023-12-21 ENCOUNTER — APPOINTMENT (RX ONLY)
Dept: URBAN - METROPOLITAN AREA CLINIC 142 | Facility: CLINIC | Age: 73
Setting detail: DERMATOLOGY
End: 2023-12-21

## 2023-12-21 DIAGNOSIS — L90.5 SCAR CONDITIONS AND FIBROSIS OF SKIN: ICD-10-CM

## 2023-12-21 PROCEDURE — 11900 INJECT SKIN LESIONS </W 7: CPT

## 2023-12-21 PROCEDURE — ? INTRALESIONAL KENALOG

## 2023-12-21 ASSESSMENT — LOCATION ZONE DERM: LOCATION ZONE: TRUNK

## 2023-12-21 ASSESSMENT — LOCATION SIMPLE DESCRIPTION DERM: LOCATION SIMPLE: CHEST

## 2023-12-21 ASSESSMENT — LOCATION DETAILED DESCRIPTION DERM: LOCATION DETAILED: STERNUM

## 2023-12-21 NOTE — PROCEDURE: INTRALESIONAL KENALOG
Validate Note Data When Using Inventory: Yes
How Many Mls Were Removed From The 80 Mg/Ml (5ml) Vial When Preparing The Injectable Solution?: 0
Include Z78.9 (Other Specified Conditions Influencing Health Status) As An Associated Diagnosis?: No
Show Inventory Tab: Hide
Kenalog Type Of Vial: Multiple Dose
Total Volume (Ccs): 0.02
Consent: The risks of atrophy were reviewed with the patient.
Expiration Date For Kenalog (Optional): Dec 2025
Concentration Of Kenalog Solution Injected (Mg/Ml): 10.0
Detail Level: Detailed
Administered By (Optional): Dr. Sims
Ndc# For Kenalog Only: 9256-0615-78
Medical Necessity Clause: This procedure was medically necessary because the lesions that were treated were:
Lot # For Kenalog (Optional): [id number]
Kenalog Preparation: Kenalog

## 2024-01-18 ENCOUNTER — APPOINTMENT (RX ONLY)
Dept: URBAN - METROPOLITAN AREA CLINIC 142 | Facility: CLINIC | Age: 74
Setting detail: DERMATOLOGY
End: 2024-01-18

## 2024-01-18 DIAGNOSIS — D18.0 HEMANGIOMA: ICD-10-CM

## 2024-01-18 DIAGNOSIS — L81.4 OTHER MELANIN HYPERPIGMENTATION: ICD-10-CM

## 2024-01-18 DIAGNOSIS — L82.1 OTHER SEBORRHEIC KERATOSIS: ICD-10-CM

## 2024-01-18 DIAGNOSIS — D22 MELANOCYTIC NEVI: ICD-10-CM

## 2024-01-18 PROBLEM — D22.5 MELANOCYTIC NEVI OF TRUNK: Status: ACTIVE | Noted: 2024-01-18

## 2024-01-18 PROBLEM — D18.01 HEMANGIOMA OF SKIN AND SUBCUTANEOUS TISSUE: Status: ACTIVE | Noted: 2024-01-18

## 2024-01-18 PROCEDURE — 99213 OFFICE O/P EST LOW 20 MIN: CPT

## 2024-01-18 PROCEDURE — ? COUNSELING

## 2024-01-18 ASSESSMENT — LOCATION DETAILED DESCRIPTION DERM
LOCATION DETAILED: EPIGASTRIC SKIN
LOCATION DETAILED: PERIUMBILICAL SKIN

## 2024-01-18 ASSESSMENT — LOCATION SIMPLE DESCRIPTION DERM: LOCATION SIMPLE: ABDOMEN

## 2024-01-18 ASSESSMENT — LOCATION ZONE DERM: LOCATION ZONE: TRUNK

## 2024-01-18 NOTE — PROCEDURE: MIPS QUALITY
Quality 130: Documentation Of Current Medications In The Medical Record: Current Medications Documented
Quality 431: Preventive Care And Screening: Unhealthy Alcohol Use - Screening: Patient not identified as an unhealthy alcohol user when screened for unhealthy alcohol use using a systematic screening method
Name And Contact Information For Health Care Proxy: Rosibel Emmanuel Wife [phone]
Quality 226: Preventive Care And Screening: Tobacco Use: Screening And Cessation Intervention: Patient screened for tobacco use and is an ex/non-smoker
Detail Level: Detailed
Quality 47: Advance Care Plan: Advance Care Planning discussed and documented; advance care plan or surrogate decision maker documented in the medical record.

## 2025-02-06 ENCOUNTER — APPOINTMENT (OUTPATIENT)
Dept: URBAN - METROPOLITAN AREA CLINIC 142 | Facility: CLINIC | Age: 75
Setting detail: DERMATOLOGY
End: 2025-02-06

## 2025-02-06 DIAGNOSIS — L57.0 ACTINIC KERATOSIS: ICD-10-CM

## 2025-02-06 DIAGNOSIS — L21.8 OTHER SEBORRHEIC DERMATITIS: ICD-10-CM

## 2025-02-06 DIAGNOSIS — L82.1 OTHER SEBORRHEIC KERATOSIS: ICD-10-CM

## 2025-02-06 DIAGNOSIS — L81.4 OTHER MELANIN HYPERPIGMENTATION: ICD-10-CM

## 2025-02-06 DIAGNOSIS — D18.0 HEMANGIOMA: ICD-10-CM

## 2025-02-06 DIAGNOSIS — L85.3 XEROSIS CUTIS: ICD-10-CM

## 2025-02-06 PROBLEM — D18.01 HEMANGIOMA OF SKIN AND SUBCUTANEOUS TISSUE: Status: ACTIVE | Noted: 2025-02-06

## 2025-02-06 PROCEDURE — ? SUNSCREEN RECOMMENDATIONS

## 2025-02-06 PROCEDURE — ? LIQUID NITROGEN

## 2025-02-06 PROCEDURE — ? COUNSELING

## 2025-02-06 PROCEDURE — 99213 OFFICE O/P EST LOW 20 MIN: CPT | Mod: 25

## 2025-02-06 PROCEDURE — 17000 DESTRUCT PREMALG LESION: CPT

## 2025-02-06 PROCEDURE — ? OTC TREATMENT REGIMEN

## 2025-02-06 ASSESSMENT — LOCATION DETAILED DESCRIPTION DERM
LOCATION DETAILED: RIGHT PROXIMAL PRETIBIAL REGION
LOCATION DETAILED: RIGHT PROXIMAL DORSAL FOREARM
LOCATION DETAILED: LEFT SUPERIOR PARIETAL SCALP
LOCATION DETAILED: RIGHT SUPERIOR UPPER BACK
LOCATION DETAILED: RIGHT INFERIOR MEDIAL MIDBACK
LOCATION DETAILED: PERIUMBILICAL SKIN
LOCATION DETAILED: RIGHT SUPERIOR PARIETAL SCALP
LOCATION DETAILED: RIGHT MID-UPPER BACK
LOCATION DETAILED: LEFT ANTERIOR DISTAL THIGH
LOCATION DETAILED: LEFT MEDIAL UPPER BACK
LOCATION DETAILED: RIGHT MEDIAL FRONTAL SCALP
LOCATION DETAILED: LEFT PROXIMAL POSTERIOR UPPER ARM
LOCATION DETAILED: RIGHT DISTAL PRETIBIAL REGION
LOCATION DETAILED: RIGHT CENTRAL MALAR CHEEK
LOCATION DETAILED: EPIGASTRIC SKIN

## 2025-02-06 ASSESSMENT — LOCATION SIMPLE DESCRIPTION DERM
LOCATION SIMPLE: RIGHT CHEEK
LOCATION SIMPLE: RIGHT UPPER BACK
LOCATION SIMPLE: RIGHT LOWER BACK
LOCATION SIMPLE: RIGHT SCALP
LOCATION SIMPLE: RIGHT PRETIBIAL REGION
LOCATION SIMPLE: SCALP
LOCATION SIMPLE: LEFT UPPER BACK
LOCATION SIMPLE: LEFT THIGH
LOCATION SIMPLE: LEFT UPPER ARM
LOCATION SIMPLE: RIGHT FOREARM
LOCATION SIMPLE: ABDOMEN

## 2025-02-06 ASSESSMENT — LOCATION ZONE DERM
LOCATION ZONE: LEG
LOCATION ZONE: TRUNK
LOCATION ZONE: SCALP
LOCATION ZONE: FACE
LOCATION ZONE: ARM

## 2025-03-26 ENCOUNTER — APPOINTMENT (OUTPATIENT)
Dept: URBAN - METROPOLITAN AREA CLINIC 142 | Facility: CLINIC | Age: 75
Setting detail: DERMATOLOGY
End: 2025-03-26

## 2025-03-26 DIAGNOSIS — I83.9 ASYMPTOMATIC VARICOSE VEINS OF LOWER EXTREMITIES: ICD-10-CM

## 2025-03-26 PROBLEM — I83.91 ASYMPTOMATIC VARICOSE VEINS OF RIGHT LOWER EXTREMITY: Status: ACTIVE | Noted: 2025-03-26

## 2025-03-26 PROCEDURE — ? COUNSELING

## 2025-03-26 PROCEDURE — ? REFERRAL

## 2025-03-26 PROCEDURE — ? ADDITIONAL NOTES

## 2025-03-26 PROCEDURE — 99212 OFFICE O/P EST SF 10 MIN: CPT

## 2025-03-26 ASSESSMENT — LOCATION DETAILED DESCRIPTION DERM: LOCATION DETAILED: RIGHT PROXIMAL PRETIBIAL REGION

## 2025-03-26 ASSESSMENT — LOCATION SIMPLE DESCRIPTION DERM: LOCATION SIMPLE: RIGHT PRETIBIAL REGION

## 2025-03-26 ASSESSMENT — LOCATION ZONE DERM: LOCATION ZONE: LEG

## 2025-03-26 NOTE — PROCEDURE: ADDITIONAL NOTES
Additional Notes: appears to be same site of previous venous puncture for procedure
Detail Level: Simple
Render Risk Assessment In Note?: yes

## 2025-03-26 NOTE — PROCEDURE: MIPS QUALITY
Name And Contact Information For Health Care Proxy: Wife
Quality 47: Advance Care Plan: Advance Care Planning discussed and documented; advance care plan or surrogate decision maker documented in the medical record.
Quality 226: Preventive Care And Screening: Tobacco Use: Screening And Cessation Intervention: Patient screened for tobacco use and is an ex/non-smoker
Detail Level: Detailed
Quality 130: Documentation Of Current Medications In The Medical Record: Current Medications Documented

## 2025-04-16 ENCOUNTER — APPOINTMENT (OUTPATIENT)
Dept: URBAN - METROPOLITAN AREA CLINIC 146 | Facility: CLINIC | Age: 75
Setting detail: DERMATOLOGY
End: 2025-04-16

## 2025-04-16 DIAGNOSIS — I87.2 VENOUS INSUFFICIENCY (CHRONIC) (PERIPHERAL): ICD-10-CM | Status: WORSENING

## 2025-04-16 PROCEDURE — ? SEPARATE AND IDENTIFIABLE DOCUMENTATION

## 2025-04-16 PROCEDURE — ? CEAP-C CLASSIFICATION

## 2025-04-16 PROCEDURE — 99213 OFFICE O/P EST LOW 20 MIN: CPT

## 2025-04-16 PROCEDURE — ? MEDICAL CONSULTATION: VENOUS DISEASE

## 2025-04-16 PROCEDURE — ? LOWER EXTREMITY DOPPLER US

## 2025-04-16 PROCEDURE — 93970 EXTREMITY STUDY: CPT

## 2025-04-16 PROCEDURE — ? ADDITIONAL NOTES

## 2025-04-16 ASSESSMENT — LOCATION DETAILED DESCRIPTION DERM
LOCATION DETAILED: RIGHT ANTERIOR DISTAL THIGH
LOCATION DETAILED: RIGHT PROXIMAL PRETIBIAL REGION

## 2025-04-16 ASSESSMENT — LOCATION SIMPLE DESCRIPTION DERM
LOCATION SIMPLE: RIGHT PRETIBIAL REGION
LOCATION SIMPLE: RIGHT THIGH

## 2025-04-16 ASSESSMENT — LOCATION ZONE DERM: LOCATION ZONE: LEG

## 2025-04-16 NOTE — PROCEDURE: LOWER EXTREMITY DOPPLER US
Intraluminal Thrombus: No
Include Thrombophlebitis Instructions: Yes
Left Intraluminal Thrombus- No: The left deep veins were imaged from the level of the common femoral vein to the posterior tibial veins. All deep veins demonstrated compressibility without evidence of intraluminal thrombus.
Right Intraluminal Thrombus- No: The right deep veins were imaged from the level of the common femoral vein to the posterior tibial veins. All deep veins demonstrated compressibility without evidence of intraluminal thrombus.
Continue Conservative Therapy Text: Continue conservative treatment (such as compression stockings, OTC analgesics, and exercise) and consider intervention if no change or worsening symptoms to varicosities.
Detail Level: Simple
Right Tributary Reflux: 3.0-3.5 seconds
Left Intraluminal Thrombus- Yes: The left deep veins were imaged from the level of the common femoral vein to the posterior tibial veins. There was evidence of intraluminal thrombus as noted above.
Size: 8-9 mm
Right Intraluminal Thrombus- Yes: The right deep veins were imaged from the level of the common femoral vein to the posterior tibial veins. There was evidence of intraluminal thrombus as noted above.
Right Tributary Size: 4-5 mm
Size Options: Use Range
Size: 5-6 mm
See Attached Documentation Text: Please refer to the attached ultrasound documentation for complete details of the procedure and the venous findings.

## 2025-04-16 NOTE — PROCEDURE: MEDICAL CONSULTATION: VENOUS DISEASE
Right Leg Venous Edema: 2- Extends above ankle but below knee
Left Leg Varicose Veins: 0- None
Left Dorsalis Pedis Pulse: 2 (Easily palpable)
Include Right Vcss In Note: Yes
Follow Up Instructions:: Patient will follow up after the bilateral duplex ultrasound venous reflux study to review the results and finalize treatment plan. The patient must wear compression stockings. Preventive strategies include weight loss through diet and exercise and toning leg muscles. A venous fact sheet was given, which reviews venous anatomy/pathophysiology and treatment options. The pathophysiology of venous disease and potential treatment options were discussed in detail, especially the non-FDA status of foam sclerotherapy with its risks benefits and alternatives. The patient's questions were answered in full.
Right Leg: Peripheral Vascular Disease?: No
Right Leg Venous Hyperpigmentation: 0- None or focal
Right Leg Circumference: medium
Right Leg Pain: 2- Daily pain or other discomfort (ie, interfering with but not preventing regular daily activities)
Detail Level: Detailed
Right Leg Compression Therapy: 3- Full compliance: stockings
Right Leg Varicose Veins: 2- Confined to calf or thigh
Left Leg Compression Therapy: 0- Not used

## 2025-04-16 NOTE — PROCEDURE: CEAP-C CLASSIFICATION
Right Leg Circumference: medium
Detail Level: Detailed
Right Dorsalis Pedis Pulse: 2 (Easily palpable)
Follow Up Instructions:: Patient will follow up after the bilateral duplex ultrasound venous reflux study to review the results and finalize treatment plan. The patient must wear compression stockings. Preventive strategies include weight loss through diet and exercise and toning leg muscles. A venous fact sheet was given, which reviews venous anatomy/pathophysiology and treatment options. The pathophysiology of venous disease and potential treatment options were discussed in detail, especially the non-FDA status of foam sclerotherapy with its risks benefits and alternatives. The patient's questions were answered in full.
Show Diagnoses Variable: Yes
Right Leg: Peripheral Vascular Disease?: No

## 2025-07-07 ENCOUNTER — APPOINTMENT (OUTPATIENT)
Dept: URBAN - METROPOLITAN AREA CLINIC 146 | Facility: CLINIC | Age: 75
Setting detail: DERMATOLOGY
End: 2025-07-07

## 2025-07-07 DIAGNOSIS — I87.2 VENOUS INSUFFICIENCY (CHRONIC) (PERIPHERAL): ICD-10-CM | Status: WORSENING

## 2025-07-07 PROCEDURE — ? ADDITIONAL NOTES

## 2025-07-07 PROCEDURE — ?

## 2025-07-07 PROCEDURE — ?: Mod: RT

## 2025-07-07 PROCEDURE — ? PHLEBECTOMY

## 2025-07-07 PROCEDURE — ? VARITHENA SCLEROTHERAPY

## 2025-07-07 ASSESSMENT — LOCATION ZONE DERM: LOCATION ZONE: LEG

## 2025-07-07 ASSESSMENT — LOCATION SIMPLE DESCRIPTION DERM: LOCATION SIMPLE: RIGHT THIGH

## 2025-07-07 ASSESSMENT — LOCATION DETAILED DESCRIPTION DERM
LOCATION DETAILED: RIGHT ANTERIOR DISTAL THIGH
LOCATION DETAILED: RIGHT ANTERIOR MEDIAL DISTAL THIGH

## 2025-07-07 NOTE — PROCEDURE: PHLEBECTOMY
Post-Care Instructions: Compression is critical to optimize your recovery and results. Compression helps to prevent blood clots and minimizes pain, swelling, bruising and bleeding.       \nMaterials to gather for your wound care (all available over the counter)-       \n1. Compression stockings x 2 pairs      \n2. Coban or Comprilan wraps (ACE Wraps are not a good substitute) x 2      \n3. Hibiclens solution      \n4. Telfa pads (nonstick gauze)      \n5. 4 x 4 gauze      \n6. Aquaphor or Vaseline ointment       \n7. Medical adhesive tape      \n___1. If you have had sedation, you must have a  with you for the first 24 hours after surgery and must not operate any motor vehicles or equipment that requires alertness and coordination.        \n___2. Activity       \n - It is important NOT to be sitting or lying down for several hours after surgery. You may begin walking immediately after surgery.       \n - As part of your surgery, an anesthetic fluid was used to numb your skin. Most of this fluid is safely absorbed by your body but some of the fluid may drain from the small skin incisions.       \n - For 2 days after treatment: Avoid aerobic exercise, weight training, and all other types of exertion that increase your breathing and pulse rates.       \n___3. Compression and Wound care; Leg compression is vital to your success and safety. It is needed for 2 weeks minimum.       \nAfter your procedure, we will place gauze over your treated site(s) followed by a wrap (Comprilan or Coban), which is left in place for 24 hours. Your elastic stocking is then worn over the wrap.  At bedtime, you may remove the stocking to sleep but keep the inner wrap (Comprilan or Coban) on.         \na. After 24 hours, remove the wrap and gauze. Make sure you are lying down.       \nb. If you experience bleeding from the surgery site, place gauze over the area and apply firm pressure for 15 minutes without lifting. You may repeat this once. If bleeding persists, contact your physician.       \nc. Change your dressing once daily.  Lie down and remove the stockings and inner Comprilan wrap but not the gauze.  Take a shower with the gauze on and get it wet.       \nd. Remove the wet gauze in the shower and clean the incisions with diluted Hibiclens solution. Finish showering and pat dry.      \ne. Place some ointment (Aquaphor, Vaseline) over any incision sites and apply Telfa or non-stick gauze pad. Secure the Telfa or non-stick gauze pad with medical tape.         \nf. While lying down, rewrap your treated leg with Coban or Comprilan  followed by your stocking. The inner wrap and outer stocking combination should be worn for the first 7 days.        \ng. After the first week, you may discontinue the inner wrap and only wear stockings for another 7 days. The longer you wear the stockings beyond the minimum 2 weeks, the faster you will heal.      \n___4. Bathing       \n - Do not bathe for 24 hours. After 24 hours, you may shower in warm (not hot) water.       \n - Clean your surgery sites during showering (3e) and when you are finished bathing, pat your leg dry with a towel and repeat wound care instructions in 3f and 3g.       \n - For one week after treatment:  Avoid immersion in hot tubs      \n___5. Discomfort;  Any pain or discomfort should decrease with each day after surgery.       \n - You may take acetaminophen (Tylenol, Extra-Strength Tylenol or Datril) or prescribed medicines as directed. If your pain is not better, then please contact your physician      \n - Do not use aspirin, products containing aspirin, or ibuprofen (for example AdvilTM or MotrinTM) for five days after your surgery, unless approved by your physician.       \n___6. Dietary restrictions;  Do not drink alcoholic beverages for two days after your phlebectomy.       \nWhat to expect after microphlebectomy       \n - Bruising and swelling at the site of the surgery are expected and is usually gone by 2 weeks.       \n - When to contact your physician Contact your physician if you experience any of the following:       \n - Treated areas become increasingly sore, tender, red or warm.       \n - Incision sites have pus like drainage      \n - Acetaminophen does not relieve your discomfort       \n - Fever higher than 100.4 degrees Fahrenheit (38 degrees Celsius)
Estimated Blood Loss (Cc): minimal
Detail Level: Detailed
Tumescent Anesthesia Volume In Cc: 10
Medical Necessity Clause: This therapy was medically necessary because the patient has
Disposition: Compression dressings were placed and stockings. Wound care instructions were given, verbal and written.
Hemostasis: Pressure
Medical Necessity Information: LCD Guidelines vary from payer to payer. Please check with your payer's policy to determine medical necessity.
Consent was obtained with risks, benefits, and alternatives discussed for the above procedures.  Photographs were taken. Preoperative medications were taken as above.
Epidermal Sutures (Optional): 5-0 Ethilon

## 2025-07-07 NOTE — PROCEDURE: VARITHENA SCLEROTHERAPY
Consent was obtained with risks, benefits, and alternatives discussed for the above procedures.  Photographs were taken.
Disposition: Compression stockings and short stretch elastic bandages were placed postoperatively.
Lot # A: [id number]
Number Of Injections (Will Not Render If 0): 0
Detail Level: Detailed
Post-Care Instructions: Compression for 3 weeks is critical to optimize your recovery and results. Compliance with compression helps to prevent blood clots and minimizes pain, swelling, bruising, skin discoloration (staining) and the recurrence of vessels.        Materials to gather for your wound care (all available over the counter):        Compression stockings x 2 pairs, 4 x 4 gauze, Comprilan wrap: 8 cm and 10 cm width wrap, Medical adhesive tape.       Compression and Wound care;  Leg compression for 3 weeks is alejandra to your success and safety. Compression at night is only needed the first day. After that, compression is needed only during waking hours.  However, if your leg feels better with compression at night, then you may continue compression at night as tolerated.       After the sclerotherapy procedure, 2 layers compression will be placed.       1. On the skin, folded or flat gauze will cover the treated areas.       2. A compression wrap (Comprilan) will be wrapped around your leg over the gauze. Once the compression wrap is in place, a compression stocking will be worn. This two layer  compression (wrap plus stocking) should be worn for the first 24 hours if tolerated.       3. After 24 hours, remove all compressions and dressings and just wear the compression stockings only during waking hours.        You will need to wear compression stockings for three weeks after your procedure, unless your physician instructs you otherwise.       Activity:       It is important NOT to be sitting or lying down for several hours after surgery. You may begin walking immediately after surgery. This is good for you, but take it easy.       For 2 days after treatment: Avoid aerobic exercise, weight training, and all other types of exertion that increase your breathing and pulse rates.       Do not get a tan for one month after sclerotherapy. Tanning increases your risk of skin discoloration.      Bathing:       After 24 hours, you may shower or bathe in tepid water, but keep the compression stocking on. Avoid immersion in hot tubs.      For pain or discomfort:       You may take acetaminophen (TylenolTM, Extra-Strength TylenolTM or DatrilTM) as directed.       Do not use aspirin, products containing aspirin, or ibuprofen (for example AdvilTM or MotrinTM) for five days after your surgery, unless approved by your physician.       Dietary restrictions: Do not drink alcoholic beverages for two days after your sclerotherapy procedure.       Possible side effects following sclerotherapy:  After sclerotherapy, mild swelling is expected. The injection sites may also become bruised or gray. You may also experience one or more of the following side effects, which almost always go away within one to four months:       Darkening of the injected veins.       Brownish staining of the skin.       Small clotted vessels under the surface of the skin that you can feel.       Bruising of the injection sites:       What to do about bruising - This will resolve within 2-3 weeks. If you wish the bruising to disappear sooner, then applying Arnicare cream (over the counter, health food stores) will help.       What to do if you feel small clotted vessels under the surface of the skin:       Call us for a follow up appointment. These small clots can be drained through a small nick.       Draining these small clots will help you heal faster and with less discoloration.       Contact your physician if you experience any of the following:       Treated areas become increasingly sore, tender, red or warm.        Acetaminophen does not relieve your discomfort.        Injection sites turn black or the skin around the site breaks down.        Ulceration of the injection sites.       You develop blisters from the tape.       You develop significant swelling or pain in the leg.       Darkening of large areas of the skin or foot.
Show Inventory Tab: Hide
Expiration Date A (Month Year): 03/26
Sclerosant Volume (Cc): 3
Sclerosant (A): Varithena Foam
Render Post Care In Note: No